# Patient Record
Sex: FEMALE | Race: BLACK OR AFRICAN AMERICAN
[De-identification: names, ages, dates, MRNs, and addresses within clinical notes are randomized per-mention and may not be internally consistent; named-entity substitution may affect disease eponyms.]

---

## 2021-07-25 ENCOUNTER — HOSPITAL ENCOUNTER (OUTPATIENT)
Dept: HOSPITAL 63 - PMG | Age: 46
End: 2021-07-25
Attending: NURSE PRACTITIONER
Payer: OTHER GOVERNMENT

## 2021-07-25 DIAGNOSIS — J06.9: Primary | ICD-10-CM

## 2021-07-25 PROCEDURE — 71046 X-RAY EXAM CHEST 2 VIEWS: CPT

## 2021-07-25 NOTE — RAD
PA and lateral chest radiograph 7/25/2021



CLINICAL HISTORY: Cough and congestion.



PA and lateral digital radiographs of the chest were obtained. No previous studies are available for 
comparison. The cardiac and mediastinal silhouettes are within normal limits in size and configuratio
n. No pulmonary infiltrate is seen. No pleural effusion or pneumothorax is noted. The osseous structu
res are grossly intact.



IMPRESSION: No acute abnormality is seen.



Electronically signed by: Dev Leigh MD (7/25/2021 9:33 AM) [id number]

## 2022-05-25 ENCOUNTER — HOSPITAL ENCOUNTER (OUTPATIENT)
Dept: HOSPITAL 63 - US | Age: 47
End: 2022-05-25
Payer: OTHER GOVERNMENT

## 2022-05-25 DIAGNOSIS — Z09: Primary | ICD-10-CM

## 2022-05-25 DIAGNOSIS — D25.9: ICD-10-CM

## 2022-05-25 DIAGNOSIS — N83.292: ICD-10-CM

## 2022-05-25 DIAGNOSIS — N83.291: ICD-10-CM

## 2022-05-25 PROCEDURE — 76856 US EXAM PELVIC COMPLETE: CPT

## 2022-05-25 PROCEDURE — 76830 TRANSVAGINAL US NON-OB: CPT

## 2022-05-26 NOTE — RAD
US PELVIS W/TV



History: Reason: HX OF FIBROIDS, FOLLOW UP COMPLEX LT OV CYST / Spl. Instructions:  / History: 



Comparison: None



Technique: Grayscale and color Doppler imaging of the pelvis was performed using transabdominal and t
ransvaginal  technique.



Findings:

The uterus measures 10.4 x 6.4 x 5.1 cm.  Heterogeneous posterior myometrial mass measures 2.0 x 2.0 
x 1.8 cm. Posterior lower myometrial mass measures 1.5 x 1.5 x 1.3 cm. Additional lower posterior romel
metrial mass measures 1.2 x 1.5 x 1.3 cm..  The endometrial stripe measures 11 mm. Minimal fluid with
in the cervical canal.



Right ovary measures 3.0 x 2.1 x 1.5 cm. Simple cyst adjacent to the right ovary measures 1.3 x 1.2 x
 1.3 cm.



Left ovary measures 3.9 x 2.5 x 2.4 cm. Complicated left ovarian follicle measures 2.5 x 1.9 x 1.7 cm
 in



Normal Doppler flow to the ovaries. No adnexal masses are seen.



IMPRESSION:

1.  Several posterior myometrial masses, likely fibroids.

2.  Small right paraovarian cyst.

3.  Complicated left ovarian follicle, may represent hemorrhagic follicle.

4.  Minimal fluid within the cervical canal.



Electronically signed by: Jamal Hunter DO (5/26/2022 10:06 AM) [id number]